# Patient Record
Sex: FEMALE | Race: WHITE | NOT HISPANIC OR LATINO | Employment: FULL TIME | URBAN - METROPOLITAN AREA
[De-identification: names, ages, dates, MRNs, and addresses within clinical notes are randomized per-mention and may not be internally consistent; named-entity substitution may affect disease eponyms.]

---

## 2017-01-31 ENCOUNTER — OFFICE VISIT (OUTPATIENT)
Dept: NEUROSURGERY | Facility: CLINIC | Age: 47
End: 2017-01-31

## 2017-01-31 VITALS — DIASTOLIC BLOOD PRESSURE: 81 MMHG | SYSTOLIC BLOOD PRESSURE: 128 MMHG | HEIGHT: 64 IN | HEART RATE: 87 BPM

## 2017-01-31 DIAGNOSIS — Z98.2 S/P VENTRICULOPERITONEAL SHUNT: Primary | ICD-10-CM

## 2017-01-31 ASSESSMENT — PAIN SCALES - GENERAL: PAINLEVEL: MODERATE PAIN (5)

## 2017-01-31 NOTE — LETTER
1/31/2017       RE: Loyda Abraham  [address] [address]     Dear Colleague,    Thank you for referring your patient, Loyda Abraham, to the [facility] NEUROSURGERY at [facility]. Please see a copy of my visit note below.    January 31, 2017            Maylin Cline PA-C   [address] [place] [address]      RE:  Loyda Abraham      Dear Ms. Cline:      We had the pleasure of seeing Ms. Abraham in Neurosurgery Clinic today for routine followup of  shunt.  As you know, Ms. Abraham had a  shunt placed following a decompression of Chiari malformation, last revised 13 months.  Ms. Abraham has been doing well with the exception of a fall she sustained approximately 3 weeks ago.  She reports she was walking down the stairs at her house on the way down to work, and she slipped and fell and struck the right side of her head onto her retaining wall.  She denied any loss of consciousness or significant laceration injury.        She has been continuing physical therapy for right shoulder tightness, as well as working with a nutritionist for improved eating habits.  Since the last visit, she has lost approximately 30 pounds through the altered diet and increased exercise.  Her goal is to lose 30 more pounds.  Overall, she is doing well with the exception of residual sensation of wearing a helmet since falling and striking her head.  She also reports that she continues to have intermittent difficulty with gait which she has had periodically for the past several years.      On exam, cranial nerves II-XII are intact.  Strength is 5/5 and symmetric in the upper and lower extremities, and reflexes are 2/4 and symmetric in the upper and lower extremities.  There is no pronator drift.  The shunt valves are palpable in the right frontal region, as well as the right parietal region.  There is some mild tenderness over T-tube of the right occipital region, and the shunt tubing  is palpable below the skin down into the neck.      Ms. Abraham has been doing well, and from our standpoint, she does not need to follow up in Neurosurgery Clinic at a scheduled interval at this point.  She is always welcome in the Neurosurgery Clinic and is encouraged to contact clinic to make an appointment should she have any concerns in the future.      Thank you for allowing us to participate in Ms. Abraham's care.  Please do not hesitate to contact the Neurosurgery Clinic if there are any questions or concerns regarding the care of this patient.          DANIEL FORD MD       As dictated by NENA HARTMAN MD, PHD, PGY1 neurosurgery resident.             D: 2017 19:01   T: 2017 07:13   MT: KATHI      Name:     VAMSI ABRAHAM   MRN:      [id number]        Account:      [id number]   :      1970           Service Date: 2017      Document: [id number]      Please see dictated note #[id number].    Nena Hartman MD,PhD  Neurosurgery PGY-1

## 2017-01-31 NOTE — NURSING NOTE
Chief Complaint   Patient presents with     RECHECK      shunt/ hx of chiari decompression    Can Echavarria CMA

## 2017-01-31 NOTE — MR AVS SNAPSHOT
"              After Visit Summary   1/31/2017    Loyda Abraham    MRN: [id number]           Patient Information     Date Of Birth          1970        Visit Information        Provider Department      1/31/2017 4:20 PM Tim Lee MD [facility] Neurosurgery        Today's Diagnoses     S/P ventriculoperitoneal shunt    -  1       Follow-ups after your visit        Who to contact     Please call your clinic at [phone] to:    Ask questions about your health    Make or cancel appointments    Discuss your medicines    Learn about your test results    Speak to your doctor   If you have compliments or concerns about an experience at your clinic, or if you wish to file a complaint, please contact [facility] Patient Relations at [phone] or email us at [email or web address]         Additional Information About Your Visit        MyChart Information     [employer] gives you secure access to your electronic health record. If you see a primary care provider, you can also send messages to your care team and make appointments. If you have questions, please call your primary care clinic.  If you do not have a primary care provider, please call [phone] and they will assist you.      [employer] is an electronic gateway that provides easy, online access to your medical records. With [employer], you can request a clinic appointment, read your test results, renew a prescription or communicate with your care team.     To access your existing account, please contact your [facility] or call [phone] for assistance.        Care EveryWhere ID     This is your Care EveryWhere ID. This could be used by other organizations to access your [facility] medical records  [id number]        Your Vitals Were     Pulse Height                87 1.626 m (5' 4\")           Blood Pressure from Last 3 Encounters:   01/31/17 128/81   10/11/16 (!) 160/91   07/19/16 147/81 "    Weight from Last 3 Encounters:   10/11/16 107.3 kg (236 lb 9.6 oz)   04/26/16 104.3 kg (230 lb)   03/15/16 104.3 kg (230 lb)              Today, you had the following     No orders found for display         Today's Medication Changes          These changes are accurate as of: 1/31/17 11:59 PM.  If you have any questions, ask your nurse or doctor.               These medicines have changed or have updated prescriptions.        Dose/Directions    ondansetron 4 MG ODT tab   Commonly known as:  ZOFRAN-ODT   This may have changed:  when to take this   Used for:  Nausea        Dose:  4 mg   Take 1 tablet (4 mg) by mouth every 6 hours as needed for nausea   Quantity:  30 tablet   Refills:  2                Primary Care Provider Office Phone # Fax #    Maylin Cline PA-C [phone]       [place] [id number]        Thank you!     Thank you for choosing [facility]  for your care. Our goal is always to provide you with excellent care. Hearing back from our patients is one way we can continue to improve our services. Please take a few minutes to complete the written survey that you may receive in the mail after your visit with us. Thank you!             Your Updated Medication List - Protect others around you: Learn how to safely use, store and throw away your medicines at www.disposemymeds.org.          This list is accurate as of: 1/31/17 11:59 PM.  Always use your most recent med list.                   Brand Name Dispense Instructions for use    ACETAMINOPHEN 8 HOUR 650 MG 8 hour tablet   Generic drug:  acetaminophen     100 tablet    Take 650 mg by mouth every 4 hours as needed       calcium carbonate 500 MG chewable tablet    TUMS     Take 1 chew tab by mouth daily       ondansetron 4 MG ODT tab    ZOFRAN-ODT    30 tablet    Take 1 tablet (4 mg) by mouth every 6 hours as needed for nausea       senna-docusate 8.6-50 MG per tablet     SENOKOT-S;PERICOLACE    30 tablet    Take 1-2 tablets by mouth as needed Hold for loose stools.       VITAMIN D (CHOLECALCIFEROL) PO      Take 5,000 Units by mouth twice a week

## 2017-02-01 NOTE — PROGRESS NOTES
January 31, 2017            Maylin Cline PA-C   [address] [place] [address]      RE:  Loyda Jarad      Dear Ms. Cline:      We had the pleasure of seeing Ms. Abraham in Neurosurgery Clinic today for routine followup of  shunt.  As you know, Ms. Abraham had a  shunt placed following a decompression of Chiari malformation, last revised 13 months.  Ms. Abraham has been doing well with the exception of a fall she sustained approximately 3 weeks ago.  She reports she was walking down the stairs at her house on the way down to work, and she slipped and fell and struck the right side of her head onto her retaining wall.  She denied any loss of consciousness or significant laceration injury.        She has been continuing physical therapy for right shoulder tightness, as well as working with a nutritionist for improved eating habits.  Since the last visit, she has lost approximately 30 pounds through the altered diet and increased exercise.  Her goal is to lose 30 more pounds.  Overall, she is doing well with the exception of residual sensation of wearing a helmet since falling and striking her head.  She also reports that she continues to have intermittent difficulty with gait which she has had periodically for the past several years.      On exam, cranial nerves II-XII are intact.  Strength is 5/5 and symmetric in the upper and lower extremities, and reflexes are 2/4 and symmetric in the upper and lower extremities.  There is no pronator drift.  The shunt valves are palpable in the right frontal region, as well as the right parietal region.  There is some mild tenderness over T-tube of the right occipital region, and the shunt tubing is palpable below the skin down into the neck.      Ms. Abraham has been doing well, and from our standpoint, she does not need to follow up in Neurosurgery Clinic at a scheduled interval at this point.  She is always welcome in the Neurosurgery Clinic and is encouraged to  contact clinic to make an appointment should she have any concerns in the future.      Thank you for allowing us to participate in Ms. Abraham's care.  Please do not hesitate to contact the Neurosurgery Clinic if there are any questions or concerns regarding the care of this patient.          DANIEL FORD MD       As dictated by NENA KINSEY MD, PHD, PGY1 neurosurgery resident.             D: 2017 19:01   T: 2017 07:13   MT: KATHI      Name:     VAMSI ABRAHAM   MRN:      [id number]        Account:      [id number]   :      1970           Service Date: 2017      Document: [id number]

## 2017-03-31 ENCOUNTER — RECORDS - HEALTHEAST (OUTPATIENT)
Dept: LAB | Facility: CLINIC | Age: 47
End: 2017-03-31

## 2017-03-31 LAB
CHOLEST SERPL-MCNC: 157 MG/DL
FASTING STATUS PATIENT QL REPORTED: ABNORMAL
HDLC SERPL-MCNC: 40 MG/DL
LDLC SERPL CALC-MCNC: 84 MG/DL
TRIGL SERPL-MCNC: 163 MG/DL

## 2017-04-06 LAB
HPV INTERPRETATION - HISTORICAL: NORMAL
HPV INTERPRETER - HISTORICAL: NORMAL

## 2017-04-10 LAB
BKR LAB AP ABNORMAL BLEEDING: NO
BKR LAB AP BIRTH CONTROL/HORMONES: ABNORMAL
BKR LAB AP CERVICAL APPEARANCE: NORMAL
BKR LAB AP GYN ADEQUACY: ABNORMAL
BKR LAB AP GYN INTERPRETATION: ABNORMAL
BKR LAB AP HPV REFLEX: ABNORMAL
BKR LAB AP LMP: ABNORMAL
BKR LAB AP PATIENT STATUS: ABNORMAL
BKR LAB AP PREVIOUS ABNORMAL: ABNORMAL
BKR LAB AP PREVIOUS NORMAL: 2014
HIGH RISK?: NO
PATH REPORT.COMMENTS IMP SPEC: ABNORMAL
RESULT FLAG (HE HISTORICAL CONVERSION): ABNORMAL

## 2017-08-18 ENCOUNTER — MEDICAL CORRESPONDENCE (OUTPATIENT)
Facility: CLINIC | Age: 47
End: 2017-08-18

## 2017-08-26 ENCOUNTER — HEALTH MAINTENANCE LETTER (OUTPATIENT)
Age: 47
End: 2017-08-26

## 2017-09-27 ENCOUNTER — OFFICE VISIT (OUTPATIENT)
Dept: NEUROSURGERY | Facility: CLINIC | Age: 47
End: 2017-09-27

## 2017-09-27 VITALS — HEART RATE: 88 BPM | SYSTOLIC BLOOD PRESSURE: 125 MMHG | DIASTOLIC BLOOD PRESSURE: 81 MMHG | HEIGHT: 64 IN

## 2017-09-27 DIAGNOSIS — Z98.2 VP (VENTRICULOPERITONEAL) SHUNT STATUS: Primary | ICD-10-CM

## 2017-09-27 ASSESSMENT — PAIN SCALES - GENERAL: PAINLEVEL: SEVERE PAIN (6)

## 2017-09-27 NOTE — MR AVS SNAPSHOT
After Visit Summary   9/27/2017    Loyda Abraham    MRN: [id number]           Patient Information     Date Of Birth          1970        Visit Information        Provider Department      9/27/2017 2:00 PM Tim Lee MD [facility] Neurosurgery        Today's Diagnoses      (ventriculoperitoneal) shunt status    -  1       Follow-ups after your visit        Your next 10 appointments already scheduled     Oct 24, 2017  7:40 AM CDT   (Arrive by 7:25 AM)   CT ABDOMEN W/O & W CONTRAST with UCCT1   [facility] CT ([facility])    [address] [address] [phone]           Please bring any scans or X-rays taken at other hospitals, if similar tests were done. Also bring a list of your medicines, including vitamins, minerals and over-the-counter drugs. It is safest to leave personal items at home.  Be sure to tell your doctor:   If you have any allergies.   If there s any chance you are pregnant.   If you are breastfeeding.   If you have any special needs.  You may have contrast for this exam. To prepare:   Do not eat or drink for 2 hours before your exam. If you need to take medicine, you may take it with small sips of water. (We may ask you to take liquid medicine as well.)   The day before your exam, drink extra fluids at least six 8-ounce glasses (unless your doctor tells you to restrict your fluids).  Patients over 70 or patients with diabetes or kidney problems:   If you haven t had a blood test (creatinine test) within the last 30 days, go to your clinic or Diagnostic Imaging Department for this test.  If you have diabetes:   If your kidney function is normal, continue taking your metformin (Avandamet, Glucophage, Glucovance, Metaglip) on the day of your exam.   If your kidney function is abnormal, wait 48 hours before restarting this medicine.  You will have oral contrast for this exam:   You will drink the  contrast at home. Get this from your clinic or Diagnostic Imaging Department. Please follow the directions given.  Please wear loose clothing, such as a sweat suit or jogging clothes. Avoid snaps, zippers and other metal. We may ask you to undress and put on a hospital gown.  If you have any questions, please call the Imaging Department where you will have your exam.            Oct 24, 2017  8:40 AM CDT   (Arrive by 8:25 AM)   Return Visit with Tim Lee MD   [facility] Neurosurgery ([facility] [place])    [address] [address] [phone]              Who to contact     Please call your clinic at [phone] to:    Ask questions about your health    Make or cancel appointments    Discuss your medicines    Learn about your test results    Speak to your doctor   If you have compliments or concerns about an experience at your clinic, or if you wish to file a complaint, please contact [facility] Patient Relations at [phone] or email us at [email or web address]         Additional Information About Your Visit        MyChart Information     [employer] gives you secure access to your electronic health record. If you see a primary care provider, you can also send messages to your care team and make appointments. If you have questions, please call your primary care clinic.  If you do not have a primary care provider, please call [phone] and they will assist you.      [employer] is an electronic gateway that provides easy, online access to your medical records. With [employer], you can request a clinic appointment, read your test results, renew a prescription or communicate with your care team.     To access your existing account, please contact your [facility] Clinic or call [phone] for assistance.        Care EveryWhere ID     This is your Care EveryWhere ID. This could be used by other  "organizations to access your [facility] medical records  [id number]        Your Vitals Were     Pulse Height                88 1.626 m (5' 4\")           Blood Pressure from Last 3 Encounters:   09/27/17 125/81   01/31/17 128/81   10/11/16 (!) 160/91    Weight from Last 3 Encounters:   10/11/16 107.3 kg (236 lb 9.6 oz)   04/26/16 104.3 kg (230 lb)   03/15/16 104.3 kg (230 lb)                 Today's Medication Changes          These changes are accurate as of: 9/27/17 11:59 PM.  If you have any questions, ask your nurse or doctor.               These medicines have changed or have updated prescriptions.        Dose/Directions    ondansetron 4 MG ODT tab   Commonly known as:  ZOFRAN-ODT   This may have changed:  when to take this   Used for:  Nausea        Dose:  4 mg   Take 1 tablet (4 mg) by mouth every 6 hours as needed for nausea   Quantity:  30 tablet   Refills:  2                Primary Care Provider Office Phone # Fax #    Maylin Cline PA-C [phone]       [place] [address]        Equal Access to Services     SERA HEIN AH: Hadii flaca garciao Sorigo, waaxda luqadaha, qaybta kaalmada adeegyada, goyo saeed. So [facility] [phone].    ATENCIÓN: Si habla español, tiene a nguyen disposición servicios gratuitos de asistencia lingüística. [facility] [phone].    We comply with applicable federal civil rights laws and [place] laws. We do not discriminate on the basis of race, color, national origin, age, disability, sex, sexual orientation, or gender identity.            Thank you!     Thank you for choosing [facility]  for your care. Our goal is always to provide you with excellent care. Hearing back from our patients is one way we can continue to improve our services. Please take a few minutes to complete the written survey that you may receive in the mail after your visit with us. Thank you!             Your " Updated Medication List - Protect others around you: Learn how to safely use, store and throw away your medicines at www.disposemymeds.org.          This list is accurate as of: 9/27/17 11:59 PM.  Always use your most recent med list.                   Brand Name Dispense Instructions for use Diagnosis    ACETAMINOPHEN 8 HOUR 650 MG 8 hour tablet   Generic drug:  acetaminophen     100 tablet    Take 650 mg by mouth every 4 hours as needed     (ventriculoperitoneal) shunt status       calcium carbonate 500 MG chewable tablet    TUMS     Take 1 chew tab by mouth daily        ondansetron 4 MG ODT tab    ZOFRAN-ODT    30 tablet    Take 1 tablet (4 mg) by mouth every 6 hours as needed for nausea    Nausea       senna-docusate 8.6-50 MG per tablet    SENOKOT-S;PERICOLACE    30 tablet    Take 1-2 tablets by mouth as needed Hold for loose stools.    Shunt malfunction, initial encounter       VITAMIN D (CHOLECALCIFEROL) PO      Take 5,000 Units by mouth twice a week

## 2017-09-27 NOTE — LETTER
2017       RE: Vamsi Abraham  [address] [address]     Dear Colleague,    Thank you for referring your patient, Vamsi Abraham, to the [facility] NEUROSURGERY at [facility]. Please see a copy of my visit note below.    2017            Maylin Cline PA-C   [address] [place] [address]      RE:  Vamsi Abraham      Dear Dr. Cline:      I had the pleasure to see Vamsi Abraham today in my Neurosurgical Clinic for evaluation of her ventriculoperitoneal shunt.  Claudia has been having abdominal pain over the last few months.  She says that this pain is intermittent, and it really migrates around her abdomen but is quite uncomfortable.      She does not have any signs of infection.      I have had this experience with a few patients who have a ventriculoperitoneal shunt in the past.  In 3 of those patients, we have gone back to the operating room and laparoscopically shortened the tubing that is in the abdomen.  This certainly may be an option for Claudia.  I would like to first start by getting a CT of the abdomen with and without contrast and ensure that there is not a problem.  If this is normal, then I would talk to Karlos Branch and plan a laparoscopic procedure in which we go in and shorten the tubing.  We will order the CT and then have her come back and see me in clinic.      Thank you for your trust and opportunity to participate in Claudia's care.  If you have further questions or concerns, please feel free to contact me on my cell phone at [phone].         DANIEL FORD MD             D: 2017 14:44   T: 2017 09:19   MT: KATHI      Name:     VAMSI ABRAHAM   MRN:      [id number]        Account:      [id number]   :      1970           Service Date: 2017      Document: [id number]       Again, thank you for allowing me to participate in the care of your patient.      Sincerely,    Daniel Ford,  MD

## 2017-09-27 NOTE — NURSING NOTE
Chief Complaint   Patient presents with     RECHECK      shunt/ Hx of chiari decompression/ Had CT done on 08/21/2017. -COLE Echavarria CMA

## 2017-09-28 NOTE — PROGRESS NOTES
2017            Maylin Cline PA-C   [address] [place] [address]      RE:  Vamsi Abraham      Dear Dr. Cline:      I had the pleasure to see Vamsi Abraham today in my Neurosurgical Clinic for evaluation of her ventriculoperitoneal shunt.  Claudia has been having abdominal pain over the last few months.  She says that this pain is intermittent, and it really migrates around her abdomen but is quite uncomfortable.      She does not have any signs of infection.      I have had this experience with a few patients who have a ventriculoperitoneal shunt in the past.  In 3 of those patients, we have gone back to the operating room and laparoscopically shortened the tubing that is in the abdomen.  This certainly may be an option for Claudia.  I would like to first start by getting a CT of the abdomen with and without contrast and ensure that there is not a problem.  If this is normal, then I would talk to Karlos Branch and plan a laparoscopic procedure in which we go in and shorten the tubing.  We will order the CT and then have her come back and see me in clinic.      Thank you for your trust and opportunity to participate in Claudia's care.  If you have further questions or concerns, please feel free to contact me on my cell phone at [phone].         DANIEL FORD MD             D: 2017 14:44   T: 2017 09:19   MT: KATHI      Name:     VAMSI ABRAHAM   MRN:      [id number]        Account:      [id number]   :      1970           Service Date: 2017      Document: [id number]

## 2017-10-24 ENCOUNTER — OFFICE VISIT (OUTPATIENT)
Dept: NEUROSURGERY | Facility: CLINIC | Age: 47
End: 2017-10-24

## 2017-10-24 VITALS
BODY MASS INDEX: 32.44 KG/M2 | DIASTOLIC BLOOD PRESSURE: 80 MMHG | HEART RATE: 77 BPM | SYSTOLIC BLOOD PRESSURE: 139 MMHG | WEIGHT: 190 LBS | HEIGHT: 64 IN

## 2017-10-24 DIAGNOSIS — Z98.2 S/P VENTRICULOPERITONEAL SHUNT: Primary | ICD-10-CM

## 2017-10-24 ASSESSMENT — PAIN SCALES - GENERAL: PAINLEVEL: NO PAIN (0)

## 2017-10-24 NOTE — MR AVS SNAPSHOT
After Visit Summary   10/24/2017    Loyda Abraham    MRN: [id number]           Patient Information     Date Of Birth          1970        Visit Information        Provider Department      10/24/2017 8:40 AM Tim Lee MD [facility] Neurosurgery        Today's Diagnoses     S/P ventriculoperitoneal shunt    -  1       Follow-ups after your visit        Follow-up notes from your care team     Return if symptoms worsen or fail to improve.      Who to contact     Please call your clinic at [phone] to:    Ask questions about your health    Make or cancel appointments    Discuss your medicines    Learn about your test results    Speak to your doctor   If you have compliments or concerns about an experience at your clinic, or if you wish to file a complaint, please contact [facility] Patient Relations at [phone] or email us at [email or web address]         Additional Information About Your Visit        MyChart Information     [employer] gives you secure access to your electronic health record. If you see a primary care provider, you can also send messages to your care team and make appointments. If you have questions, please call your primary care clinic.  If you do not have a primary care provider, please call [phone] and they will assist you.      [employer] is an electronic gateway that provides easy, online access to your medical records. With [employer], you can request a clinic appointment, read your test results, renew a prescription or communicate with your care team.     To access your existing account, please contact your [facility] or call [phone] for assistance.        Care EveryWhere ID     This is your Care EveryWhere ID. This could be used by other organizations to access your [place] medical records  [id number]        Your Vitals Were     Pulse Height BMI (Body Mass Index)             77 1.626 m  "(5' 4\") 32.61 kg/m2          Blood Pressure from Last 3 Encounters:   10/24/17 139/80   09/27/17 125/81   01/31/17 128/81    Weight from Last 3 Encounters:   10/24/17 86.2 kg (190 lb)   10/11/16 107.3 kg (236 lb 9.6 oz)   04/26/16 104.3 kg (230 lb)              Today, you had the following     No orders found for display         Today's Medication Changes          These changes are accurate as of: 10/24/17 11:59 PM.  If you have any questions, ask your nurse or doctor.               These medicines have changed or have updated prescriptions.        Dose/Directions    ondansetron 4 MG ODT tab   Commonly known as:  ZOFRAN-ODT   This may have changed:  when to take this   Used for:  Nausea        Dose:  4 mg   Take 1 tablet (4 mg) by mouth every 6 hours as needed for nausea   Quantity:  30 tablet   Refills:  2                Primary Care Provider Office Phone # Fax #    Maylin Cline PA-C [phone]       [place] [address]        Equal Access to Services     [facility] AH: Hadii flaca garner hadcharleso Sorigo, waaxda luqadaha, qaybta kaalmada felix, goyo mack . [facility] [phone].    ATENCIÓN: Si habla español, tiene a nguyen disposición servicios gratuitos de asistencia lingüística. [facility] [phone].    We comply with applicable federal civil rights laws and [place] laws. We do not discriminate on the basis of race, color, national origin, age, disability, sex, sexual orientation, or gender identity.            Thank you!     Thank you for choosing [facility]  for your care. Our goal is always to provide you with excellent care. Hearing back from our patients is one way we can continue to improve our services. Please take a few minutes to complete the written survey that you may receive in the mail after your visit with us. Thank you!             Your Updated Medication List - Protect others around you: Learn how " to safely use, store and throw away your medicines at www.disposemymeds.org.          This list is accurate as of: 10/24/17 11:59 PM.  Always use your most recent med list.                   Brand Name Dispense Instructions for use Diagnosis    ACETAMINOPHEN 8 HOUR 650 MG 8 hour tablet   Generic drug:  acetaminophen     100 tablet    Take 650 mg by mouth every 4 hours as needed     (ventriculoperitoneal) shunt status       calcium carbonate 500 MG chewable tablet    TUMS     Take 1 chew tab by mouth daily        ondansetron 4 MG ODT tab    ZOFRAN-ODT    30 tablet    Take 1 tablet (4 mg) by mouth every 6 hours as needed for nausea    Nausea       senna-docusate 8.6-50 MG per tablet    SENOKOT-S;PERICOLACE    30 tablet    Take 1-2 tablets by mouth as needed Hold for loose stools.    Shunt malfunction, initial encounter       VITAMIN D (CHOLECALCIFEROL) PO      Take 5,000 Units by mouth twice a week

## 2017-10-24 NOTE — LETTER
10/24/2017       RE: Loyda Abraham  [address] [address]     Dear Colleague,    Thank you for referring your patient, Loyda Abraham, to the [facility] NEUROSURGERY at [facility]. Please see a copy of my visit note below.    October 24, 2017            Maylin Cline PA-C   [address] [place] [address]      RE:  Loyda Abraham      Dear Ms. Cline:      I had the pleasure to see Loyda today in my Neurosurgical Clinic for evaluation of her ventriculoperitoneal shunt.      Briefly, Loyda is a 47-year-old woman that I have known for several years.  She has a ventriculoperitoneal shunt and has been doing well with the exception of intermittent abdominal pain.  She says that this pain is sharp at times, moves around and is intermittent.  She does not feel that it is significantly impacting her quality of life, however.      I had seen her a few weeks ago and wanted to get a CT of the abdomen to ensure that there is no pseudomeningocele or any concerning abnormalities.  She had a CT today, and I reviewed it with her in clinic.  I do not see any pseudomeningocele, pseudocyst or any other concerning finding.  I have seen in the past patients where the shunt tubing can be an irritant and can cause intermittent symptoms like she is describing.  We have taken 3 patients back to the operating room and laparoscopically shortened the tubing. In 2 of these patients, their pain improved, and in 1, it did not.  I have talked to her about this today; and ultimately, we decided that we would not pursue any surgical intervention at the moment and follow this p.r.n.      Overall, I spent approximately 20 minutes with Loyda, with the majority of this time spent in consultation and developing a treatment plan.      Thank you for your trust and the opportunity to participate in Loyda's care.  If you have any further questions or concerns, please feel free to  contact me on my cell phone at [phone].         DANIEL FORD MD             D: 10/24/2017 08:34   T: 10/24/2017 08:50   MT: KATHI      Name:     VAMSI CABA   MRN:      [id number]        Account:      [id number]   :      1970           Service Date: 10/24/2017      Document: [id number]       Again, thank you for allowing me to participate in the care of your patient.      Sincerely,    Daniel Ford MD

## 2017-10-24 NOTE — PROGRESS NOTES
2017            Maylin Cline PA-C   [address] [place] [address]      RE:  Vamsi Abraham      Dear Ms. Son:      I had the pleasure to see Vamsi today in my Neurosurgical Clinic for evaluation of her ventriculoperitoneal shunt.      Briefly, Vamsi is a 47-year-old woman that I have known for several years.  She has a ventriculoperitoneal shunt and has been doing well with the exception of intermittent abdominal pain.  She says that this pain is sharp at times, moves around and is intermittent.  She does not feel that it is significantly impacting her quality of life, however.      I had seen her a few weeks ago and wanted to get a CT of the abdomen to ensure that there is no pseudomeningocele or any concerning abnormalities.  She had a CT today, and I reviewed it with her in clinic.  I do not see any pseudomeningocele, pseudocyst or any other concerning finding.  I have seen in the past patients where the shunt tubing can be an irritant and can cause intermittent symptoms like she is describing.  We have taken 3 patients back to the operating room and laparoscopically shortened the tubing. In 2 of these patients, their pain improved, and in 1, it did not.  I have talked to her about this today; and ultimately, we decided that we would not pursue any surgical intervention at the moment and follow this p.r.n.      Overall, I spent approximately 20 minutes with Vamsi, with the majority of this time spent in consultation and developing a treatment plan.      Thank you for your trust and the opportunity to participate in Vamsi's care.  If you have any further questions or concerns, please feel free to contact me on my cell phone at [phone].         DANIEL FORD MD             D: 10/24/2017 08:34   T: 10/24/2017 08:50   MT: KATHI      Name:     VAMSI ABRAHAM   MRN:      [id number]        Account:      [id number]   :      1970           Service Date:  10/24/2017      Document: [id number]

## 2018-04-10 ENCOUNTER — APPOINTMENT (OUTPATIENT)
Dept: CT IMAGING | Facility: CLINIC | Age: 48
End: 2018-04-10
Attending: EMERGENCY MEDICINE
Payer: COMMERCIAL

## 2018-04-10 ENCOUNTER — TELEPHONE (OUTPATIENT)
Dept: NEUROSURGERY | Facility: CLINIC | Age: 48
End: 2018-04-10

## 2018-04-10 ENCOUNTER — APPOINTMENT (OUTPATIENT)
Dept: MRI IMAGING | Facility: CLINIC | Age: 48
End: 2018-04-10
Attending: EMERGENCY MEDICINE
Payer: COMMERCIAL

## 2018-04-10 ENCOUNTER — HOSPITAL ENCOUNTER (EMERGENCY)
Facility: CLINIC | Age: 48
Discharge: HOME | End: 2018-04-10
Attending: EMERGENCY MEDICINE | Admitting: EMERGENCY MEDICINE
Payer: COMMERCIAL

## 2018-04-10 VITALS
TEMPERATURE: 98 F | WEIGHT: 196.5 LBS | OXYGEN SATURATION: 95 % | HEIGHT: 64 IN | SYSTOLIC BLOOD PRESSURE: 113 MMHG | HEART RATE: 90 BPM | DIASTOLIC BLOOD PRESSURE: 69 MMHG | RESPIRATION RATE: 16 BRPM | BODY MASS INDEX: 33.55 KG/M2

## 2018-04-10 DIAGNOSIS — Z98.2 VP (VENTRICULOPERITONEAL) SHUNT STATUS: ICD-10-CM

## 2018-04-10 DIAGNOSIS — R20.0 RIGHT FACIAL NUMBNESS: ICD-10-CM

## 2018-04-10 LAB
ANION GAP SERPL CALCULATED.3IONS-SCNC: 6 MMOL/L (ref 3–14)
APTT PPP: 28 SEC (ref 22–37)
BASOPHILS # BLD AUTO: 0 10E9/L (ref 0–0.2)
BASOPHILS NFR BLD AUTO: 0.2 %
BUN SERPL-MCNC: 21 MG/DL (ref 7–30)
CALCIUM SERPL-MCNC: 9.9 MG/DL (ref 8.5–10.1)
CHLORIDE SERPL-SCNC: 105 MMOL/L (ref 94–109)
CO2 SERPL-SCNC: 26 MMOL/L (ref 20–32)
CREAT BLD-MCNC: 0.9 MG/DL (ref 0.52–1.04)
CREAT SERPL-MCNC: 0.84 MG/DL (ref 0.52–1.04)
DIFFERENTIAL METHOD BLD: NORMAL
EOSINOPHIL # BLD AUTO: 0.2 10E9/L (ref 0–0.7)
EOSINOPHIL NFR BLD AUTO: 2.1 %
ERYTHROCYTE [DISTWIDTH] IN BLOOD BY AUTOMATED COUNT: 12.7 % (ref 10–15)
GFR SERPL CREATININE-BSD FRML MDRD: 67 ML/MIN/1.7M2
GFR SERPL CREATININE-BSD FRML MDRD: 73 ML/MIN/1.7M2
GLUCOSE BLDC GLUCOMTR-MCNC: 107 MG/DL (ref 70–99)
GLUCOSE SERPL-MCNC: 87 MG/DL (ref 70–99)
HCT VFR BLD AUTO: 45.7 % (ref 35–47)
HGB BLD-MCNC: 15.3 G/DL (ref 11.7–15.7)
IMM GRANULOCYTES # BLD: 0.1 10E9/L (ref 0–0.4)
IMM GRANULOCYTES NFR BLD: 0.6 %
INR BLD: 1 (ref 0.86–1.14)
INR PPP: 0.98 (ref 0.86–1.14)
LYMPHOCYTES # BLD AUTO: 2.9 10E9/L (ref 0.8–5.3)
LYMPHOCYTES NFR BLD AUTO: 33.4 %
MCH RBC QN AUTO: 30.1 PG (ref 26.5–33)
MCHC RBC AUTO-ENTMCNC: 33.5 G/DL (ref 31.5–36.5)
MCV RBC AUTO: 90 FL (ref 78–100)
MONOCYTES # BLD AUTO: 0.7 10E9/L (ref 0–1.3)
MONOCYTES NFR BLD AUTO: 7.8 %
NEUTROPHILS # BLD AUTO: 4.8 10E9/L (ref 1.6–8.3)
NEUTROPHILS NFR BLD AUTO: 55.9 %
NRBC # BLD AUTO: 0 10*3/UL
NRBC BLD AUTO-RTO: 0 /100
PLATELET # BLD AUTO: 259 10E9/L (ref 150–450)
POTASSIUM SERPL-SCNC: 4.1 MMOL/L (ref 3.4–5.3)
RBC # BLD AUTO: 5.09 10E12/L (ref 3.8–5.2)
SODIUM SERPL-SCNC: 137 MMOL/L (ref 133–144)
WBC # BLD AUTO: 8.6 10E9/L (ref 4–11)

## 2018-04-10 PROCEDURE — 70551 MRI BRAIN STEM W/O DYE: CPT

## 2018-04-10 PROCEDURE — 80048 BASIC METABOLIC PNL TOTAL CA: CPT | Performed by: EMERGENCY MEDICINE

## 2018-04-10 PROCEDURE — 85025 COMPLETE CBC W/AUTO DIFF WBC: CPT | Performed by: EMERGENCY MEDICINE

## 2018-04-10 PROCEDURE — 82565 ASSAY OF CREATININE: CPT | Mod: 91

## 2018-04-10 PROCEDURE — 00000146 ZZHCL STATISTIC GLUCOSE BY METER IP

## 2018-04-10 PROCEDURE — 96360 HYDRATION IV INFUSION INIT: CPT | Mod: 59 | Performed by: INTERNAL MEDICINE

## 2018-04-10 PROCEDURE — 99285 EMERGENCY DEPT VISIT HI MDM: CPT | Mod: 25 | Performed by: INTERNAL MEDICINE

## 2018-04-10 PROCEDURE — 25000128 H RX IP 250 OP 636: Performed by: RADIOLOGY

## 2018-04-10 PROCEDURE — 99285 EMERGENCY DEPT VISIT HI MDM: CPT | Mod: Z6 | Performed by: EMERGENCY MEDICINE

## 2018-04-10 PROCEDURE — 85610 PROTHROMBIN TIME: CPT | Performed by: EMERGENCY MEDICINE

## 2018-04-10 PROCEDURE — 25000128 H RX IP 250 OP 636: Performed by: EMERGENCY MEDICINE

## 2018-04-10 PROCEDURE — 93005 ELECTROCARDIOGRAM TRACING: CPT | Performed by: INTERNAL MEDICINE

## 2018-04-10 PROCEDURE — 70498 CT ANGIOGRAPHY NECK: CPT

## 2018-04-10 PROCEDURE — 85610 PROTHROMBIN TIME: CPT | Mod: QW

## 2018-04-10 PROCEDURE — 85730 THROMBOPLASTIN TIME PARTIAL: CPT | Performed by: EMERGENCY MEDICINE

## 2018-04-10 RX ORDER — IOPAMIDOL 755 MG/ML
75 INJECTION, SOLUTION INTRAVASCULAR ONCE
Status: COMPLETED | OUTPATIENT
Start: 2018-04-10 | End: 2018-04-10

## 2018-04-10 RX ADMIN — SODIUM CHLORIDE 500 ML: 9 INJECTION, SOLUTION INTRAVENOUS at 12:19

## 2018-04-10 RX ADMIN — IOPAMIDOL 75 ML: 755 INJECTION, SOLUTION INTRAVENOUS at 11:35

## 2018-04-10 ASSESSMENT — ENCOUNTER SYMPTOMS
WEAKNESS: 0
TREMORS: 1
NUMBNESS: 1
HEADACHES: 1

## 2018-04-10 ASSESSMENT — VISUAL ACUITY
OU: NORMAL ACUITY

## 2018-04-10 NOTE — CONSULTS
[facility]  NEUROSURGERY CONSULTATION NOTE    This consultation was requested by Dr. Sosa from the Emergency medicine service.    Reason for Consultation  Concern for  shunt failure    HPI:  47 year old female with history of Chiari decompression at [place] in 2012.  She subsequently developed a pseudomeningocele and a shunt was placed.  She ended up developing shunt infection and other shunt related complications necessitating corrections.  In 11/2015, she required removal of her initial shunt system.  At that time, an occipital shunt was placed.  In 12/2015, it was noted her lateral ventricle was not fully draining so a frontal shunt was then also placed.  Per recent notes, her right frontal shunt is a Strata with the valve set at 1.0.  Her occipital shunt is also a Strata valve set to 2.0.   Patient presents to ED on 4/10/18 after waking this morning after biting her tongue and having an episode of tremor/shaking.  She returned to sleep and again had similar symptoms.  She also described having diminished sensation on the right side of her face.  Has had increased intensity of headaches over the past week and feels her vision is  fuzzy.   Has been nauseated but denies emesis.  Has also felt unsteady on her feet and does endorse falling earlier today.  Denies weakness in upper or lower extremities.  Patient has not history of seizure activity.  Denies use of alcohol.  Does not take daily medications.    Upon presentation to the ED a stroke code was activated, no immediate action taken, MRI brain pending at time of documentation.         PAST MEDICAL HISTORY:   Past Medical History:   Diagnosis Date     Gastro-oesophageal reflux disease      Hydrocephalus      PONV (postoperative nausea and vomiting)        PAST SURGICAL HISTORY:   Past Surgical History:   Procedure Laterality Date     CHOLECYSTECTOMY       DECOMPRESSION CHIARI  11/22/2013    Procedure:  DECOMPRESSION CHIARI;;  Surgeon: Tim Lee MD;  Location: [facility] OR     OPTICAL TRACKING SYSTEM IMPLANT SHUNT VENTRICULOPERITONEAL Left 1/6/2015    Procedure: OPTICAL TRACKING SYSTEM IMPLANT SHUNT VENTRICULOPERITONEAL;  Surgeon: Tim Lee MD;  Location: [facility] OR     OPTICAL TRACKING SYSTEM IMPLANT SHUNT VENTRICULOPERITONEAL Right 3/6/2015    Procedure: OPTICAL TRACKING SYSTEM IMPLANT SHUNT VENTRICULOPERITONEAL;  Surgeon: Tim Lee MD;  Location: [facility] OR     OPTICAL TRACKING SYSTEM IMPLANT SHUNT VENTRICULOPERITONEAL Right 12/9/2015    Procedure: OPTICAL TRACKING SYSTEM IMPLANT SHUNT VENTRICULOPERITONEAL;  Surgeon: Tim Lee MD;  Location: [facility] OR     OPTICAL TRACKING SYSTEM IMPLANT SHUNT VENTRICULOPERITONEAL Right 12/15/2015    Procedure: OPTICAL TRACKING SYSTEM IMPLANT SHUNT VENTRICULOPERITONEAL;  Surgeon: Tim Lee MD;  Location: [facility] OR     ORTHOPEDIC SURGERY       REVISE SHUNT VENTRICULARPERITONEAL  11/22/2013    Procedure: REVISE SHUNT VENTRICULARPERITONEAL;  Left Ventricularperitoneal Shunt Exploration with Revision,   Suboccipital Pseudomeningocele Repair;  Surgeon: Tim Lee MD;  Location: [facility] OR     REVISE SHUNT VENTRICULARPERITONEAL Left 2/27/2015    Procedure: REVISE SHUNT VENTRICULARPERITONEAL;  Surgeon: Tim Lee MD;  Location: [facility] OR     REVISE SHUNT VENTRICULARPERITONEAL Right 10/26/2015    Procedure: REVISE SHUNT VENTRICULARPERITONEAL;  Surgeon: Tim Lee MD;  Location: [facility] OR       FAMILY HISTORY: No family history on file.    SOCIAL HISTORY:   Social History   Substance Use Topics     Smoking status: Never Smoker     Smokeless tobacco: Never Used     Alcohol use No       MEDICATIONS:  Current Outpatient Prescriptions   Medication Sig Dispense Refill     VITAMIN D, CHOLECALCIFEROL, PO Take 5,000 Units by mouth twice a week        senna-docusate (SENOKOT-S;PERICOLACE) 8.6-50 MG per tablet Take 1-2 tablets by mouth  "as needed Hold for loose stools. 30 tablet 0     calcium carbonate (TUMS) 500 MG chewable tablet Take 1 chew tab by mouth daily       ondansetron (ZOFRAN-ODT) 4 MG disintegrating tablet Take 1 tablet (4 mg) by mouth every 6 hours as needed for nausea (Patient taking differently: Take 4 mg by mouth as needed for nausea ) 30 tablet 2     acetaminophen 650 MG TABS Take 650 mg by mouth every 4 hours as needed 100 tablet        Allergies:  Allergies   Allergen Reactions     Morphine Hcl Rash         ROS: 10 point ROS of systems including Constitutional, Eyes, Respiratory, Cardiovascular, Gastroenterology, Genitourinary, Integumentary, Muscularskeletal, Psychiatric were all negative except for pertinent positives noted in my HPI.      Exam:   Physical Exam  /89  Pulse 99  Temp 98  F (36.7  C) (Oral)  Resp 25  Ht 1.626 m (5' 4\")  Wt 89.1 kg (196 lb 8 oz)  SpO2 97%  BMI 33.73 kg/m2  General: Appears comfortable, NAD  Neurologic Exam:  - AOx3.  - Follows commands.  - Speech fluent, spontaneous. No aphasia or dysarthria.  - No gaze preference. No apparent hemineglect.  - PERRL, EOMI.  - Face symmetric with sensation intact to light touch with exception of mild diminished sensation to the right cheek  - Palate elevates symmetrically, uvula midline, tongue protrudes midline.  - Trapezii and sternocleidomastoid muscles 5/5 bilaterally.  - No pronator drift.  Motor: Normal bulk/tone; no tremor, rigidity, or bradykinesia.   Right:  Deltoid 5/5, tricep 5/5, bicep 5/5, wrist flexor 5/5, wrist extensor 5/5, finger intrinsic 5/5  Left:  Deltoid 5/5, tricep 5/5, bicep 5/5, wrist flexor 5/5, wrist extensor 5/5, finger intrinsic 5/5  Right: Iliopsoas 5/5, quadricep 5/5, hamstring 5/5, tibialis anterior 5/5, gastrocnemius 5/5, EHL 5/5  Left:  Iliopsoas 5/5, quadricep 5/5, hamstring 5/5, tibialis anterior 5/5, gastrocnemius 5/5, EHL 5/5    Sensation intact in bilateral L4-S1 dermatones     No Valentin's   No pronator drift "           CT Head Neck Angio w/o & w Contrast   Preliminary Result   Impression: Negative head CT/head CTa            MR Brain for Stroke Limited    (Results Pending)       CTA head (4/10):  Head CTA demonstrates no definite aneurysm or stenosis of the major  intracranial arteries. The anterior communicating artery is patent.  The posterior communicating arteries are patent bilaterally.    Head CT  (4/10):  Stable positioning of right frontal and right parietal  approach ventriculostomy catheters with tips terminating in the region  of the foramen of Monro. No ventriculomegaly. No loss of gray-white  matter differentiation to suggest infarct. Stable postsurgical changes  of suboccipital craniectomy and C1 laminectomy for Chiari I  Decompression.    MRI Brain:  Pending     ASSESSMENT:  47 year old female with history of Chiari decompression and right frontal and left occipital shunt placement presenting with complaints of headache, gait unsteadiness and episodes of tremor    RECOMMENDATIONS:  - No neurosurgical intervention indicated at this time   -Does not appear the patient's shunt is the cause of patient's current symptoms, would recommend follow up with Dr Tim Lee in Neurosurgery clinic in 2-4 weeks without imaging    The patient was discussed with Dr. Favio Griggs, neurosurgery chief resident, and he agrees with the above.    Sonali Macias, CNP  Department of Neurosurgery  Pager: [phone]

## 2018-04-10 NOTE — PROVIDER NOTIFICATION
Stroke Code Nurse-Responder Note    Arrival Time to Stroke Code: 11:24    Stroke Code Team interventions:   - De-escalated at 1132 by Neurologist.    ED/Bedside Nurse providing handoff: Monik Lopes RN, Rocky Logan received hand-off from ED RNDallin.    Time left for CT: 11:26    Time arrived to next location (ED/Unit/IR): Pt. Transported back to ED from CT scan at 11:33.    ED/Bedside Nurse given handoff (name/time): Monik Lopes RN, Resource Float provided hand-off to ED RNDallin.    Barbara Lopes, RN

## 2018-04-10 NOTE — ED NOTES
"Pr presents to ED with concern that she had two seizures at home. Pt states she woke herself up at 0200 biting her tongue. Pt states she got up and felt really weak and tried to fall back asleep. pt states she found her self \"vibrating\"for approx 30 minutes around 0400. Pt has hx of  shunt X2. Pt complaining of right sided facial numbness and right neck pain.  Dr. Young brought to triage room to assess patient for possible stroke. Stroke code called at 1122.   "

## 2018-04-10 NOTE — PROGRESS NOTES
Neurosurgery Procedure Note    Shunt valve reset after MRI    Right frontal Strata: 1.0    Right occipital Strata: 2.0    -----------------------------------  Hai Dee MD, MS  Neurosurgery PGY-1

## 2018-04-10 NOTE — ED PROVIDER NOTES
"  History     Chief Complaint   Patient presents with     Seizures     HPI  Loyda Abraham is a 47 year old female with a history of hydrocephalus s/p  shunt (x2) who presents for evaluation of right-sided facial numbness and possible seizures.  Patient reports she woke up at 2 AM this morning because she had whole body shaking, and noticed that she had bitten her tongue.  Her  had been sleeping on the couch, so she went and got him, and he also noticed that the patient was shaking.  She describes the shaking as though \"my whole body was vibrating\" and did bite her tongue a second time around 4:30 AM.  When she woke up at 2 AM and she stayed awake for about 30 minutes and then around 2:30 AM, noticed that she had the onset of right-sided facial numbness that has been constant since.  She was up around 6:30 AM when she noticed she had some gait instability and was tripping over her right foot. No recent falls, but she does note that on Friday she had an episode where she bent over and had the sudden sensation as though someone had hit her in the occipital region of her head. She complains she has had headaches since that episode described as pressure that is gradually worsening and is concerned her symptoms could be due to a shunt malfunction. She has had similar symptoms in the past when her left ventricle in her brain collapsed.     I have reviewed the Medications, Allergies, Past Medical and Surgical History, and Social History in the [facility] system.  Past Medical History:   Diagnosis Date     Gastro-oesophageal reflux disease      Hydrocephalus      PONV (postoperative nausea and vomiting)        Past Surgical History:   Procedure Laterality Date     CHOLECYSTECTOMY       DECOMPRESSION CHIARI  11/22/2013    Procedure: DECOMPRESSION CHIARI;;  Surgeon: Tim Lee MD;  [facility]     OPTICAL TRACKING SYSTEM IMPLANT SHUNT VENTRICULOPERITONEAL Left 1/6/2015    Procedure: OPTICAL TRACKING SYSTEM " IMPLANT SHUNT VENTRICULOPERITONEAL;  Surgeon: Tim Lee MD;  [facility]     OPTICAL TRACKING SYSTEM IMPLANT SHUNT VENTRICULOPERITONEAL Right 3/6/2015    Procedure: OPTICAL TRACKING SYSTEM IMPLANT SHUNT VENTRICULOPERITONEAL;  Surgeon: Tim Lee MD;  [facility]     OPTICAL TRACKING SYSTEM IMPLANT SHUNT VENTRICULOPERITONEAL Right 12/9/2015    Procedure: OPTICAL TRACKING SYSTEM IMPLANT SHUNT VENTRICULOPERITONEAL;  Surgeon: Tim Lee MD;  [facility]     OPTICAL TRACKING SYSTEM IMPLANT SHUNT VENTRICULOPERITONEAL Right 12/15/2015    Procedure: OPTICAL TRACKING SYSTEM IMPLANT SHUNT VENTRICULOPERITONEAL;  Surgeon: Tim Lee MD;  [facility]     ORTHOPEDIC SURGERY       REVISE SHUNT VENTRICULARPERITONEAL  11/22/2013    Procedure: REVISE SHUNT VENTRICULARPERITONEAL;  Left Ventricularperitoneal Shunt Exploration with Revision,   Suboccipital Pseudomeningocele Repair;  Surgeon: Tim Lee MD;  [facility]     REVISE SHUNT VENTRICULARPERITONEAL Left 2/27/2015    Procedure: REVISE SHUNT VENTRICULARPERITONEAL;  Surgeon: Tim Lee MD;  [facility]     REVISE SHUNT VENTRICULARPERITONEAL Right 10/26/2015    Procedure: REVISE SHUNT VENTRICULARPERITONEAL;  Surgeon: Tim Lee MD;  [facility]       No family history on file.    Social History   Substance Use Topics     Smoking status: Never Smoker     Smokeless tobacco: Never Used     Alcohol use No       No current facility-administered medications for this encounter.      Current Outpatient Prescriptions   Medication     VITAMIN D, CHOLECALCIFEROL, PO     senna-docusate (SENOKOT-S;PERICOLACE) 8.6-50 MG per tablet     calcium carbonate (TUMS) 500 MG chewable tablet     ondansetron (ZOFRAN-ODT) 4 MG disintegrating tablet     acetaminophen 650 MG TABS        Allergies   Allergen Reactions     Morphine Hcl Rash       Review of Systems   Eyes: Negative for visual  "disturbance.   Musculoskeletal: Positive for gait problem.   Neurological: Positive for tremors, numbness (right-sided facial) and headaches. Negative for weakness.   All other systems reviewed and are negative.      Physical Exam   BP: (!) 150/98  Pulse: 90  Heart Rate: 93  Temp: 98  F (36.7  C)  Resp: 16  Height: 162.6 cm (5' 4\")  Weight: 89.1 kg (196 lb 8 oz)  SpO2: 99 %      Physical Exam   Constitutional: She is oriented to person, place, and time. Vital signs are normal. She appears well-developed and well-nourished.  Non-toxic appearance. She does not appear ill. No distress.   Patient is awake and alert, mentating normally, no acute distress.   HENT:   Head: Normocephalic and atraumatic.   Mouth/Throat: Oropharynx is clear and moist. No oropharyngeal exudate.   Eyes: Conjunctivae and EOM are normal. Pupils are equal, round, and reactive to light. No scleral icterus.   Neck: Normal range of motion. Neck supple. No JVD present. No tracheal deviation present. No thyromegaly present.   No meningeal signs noted.   Cardiovascular: Normal rate, regular rhythm, normal heart sounds and intact distal pulses.  Exam reveals no gallop and no friction rub.    No murmur heard.  Pulmonary/Chest: Effort normal and breath sounds normal. No respiratory distress.   Abdominal: Soft. Bowel sounds are normal. She exhibits no distension and no mass. There is no tenderness.   Musculoskeletal: Normal range of motion. She exhibits no edema or tenderness.   Lymphadenopathy:     She has no cervical adenopathy.   Neurological: She is alert and oriented to person, place, and time. She has normal strength. She is not disoriented. No cranial nerve deficit.   Patient reports subjective decreased sensation right side of face and body.   Skin: Skin is warm and dry. No rash noted. No erythema. No pallor.   Psychiatric: She has a normal mood and affect. Her behavior is normal.   Nursing note and vitals reviewed.      ED Course     ED Course "     Procedures        Results for orders placed or performed during the hospital encounter of 04/10/18   CT Head Neck Angio w/o & w Contrast    Narrative    CTA ANGIOGRAM HEAD NECK 4/10/2018 11:53 AM    Head CT without contrast  CT angiogram of the neck   CT angiogram of the base of the brain with contrast  Reconstruction by the Radiologist on the 3D workstation    History:  Right facial numbness, history of  shunt;   Comparison:  Head CT 8/21/2017.    Technique:    HEAD CT:  Using multidetector thin collimation helical acquisition  technique, axial, coronal and sagittal CT images from the skull base  to the vertex were obtained without intravenous contrast.     HEAD and NECK CTA: During rapid bolus intravenous injection of  nonionic contrast material, axial images were obtained using thin  collimation multidetector helical technique from the top of the skull  through the aortic arch. This CT angiogram data was reconstructed at  thin intervals with mild overlap. Images were sent to the Coin  workstation, and 3D reconstructions were obtained. The axial source  images, multiplanar reformations, 3D reconstructions in both maximum  intensity projection display and volume rendered models were reviewed,  with reconstructions performed by the technologist and the  radiologist.    Contrast: iopamidol (ISOVUE-370) solution 75 mL    Findings:  Head CT:  Stable positioning of right frontal and right parietal  approach ventriculostomy catheters with tips terminating in the region  of the foramen of Monro. No ventriculomegaly. No loss of gray-white  matter differentiation to suggest infarct. Stable postsurgical changes  of suboccipital craniectomy and C1 laminectomy for Chiari I  decompression.    No intracranial hemorrhage, mass effect, midline shift or abnormal  extra axial fluid collection. Right basal ganglia enlarged  perivascular space. Minimal leukoaraiosis.     Paranasal sinuses and mastoid air cells are  clear.    Head CTA demonstrates no definite aneurysm or stenosis of the major  intracranial arteries. The anterior communicating artery is patent.  The posterior communicating arteries are patent bilaterally.     Neck CTA demonstrates no evident stenosis of the major cervical  arteries. The origins of the great vessels from the aortic arch are  patent. The normal distal right internal carotid artery measures 5 mm.  The normal distal left internal carotid artery measures 5 mm.     No mass is noted within the visualized portions of the cervical soft  tissues or lung apices.       Impression    Impression:  1. Normal head and neck CTA.  2. No evidence of acute intracranial pathology. Stable head CT since  8/21/2017    I have personally reviewed the examination and initial interpretation  and I agree with the findings.    YANN RIGGS MD   MR Brain for Stroke Limited    Narrative    Limited Stroke Brain MRI and Head MRA without contrast    History: Right-sided body numbness, please do stroke limited; .  ICD-10:  Comparison: 4/10/2018 head CT and multiple prior    Technique: Axial diffusion-weighted and axial turboFLAIR images of the  brain were obtained without intravenous contrast.    Findings: As on the CT scan from the same day, there are 2 shunt  catheters, one entering the right frontal region and one entering the  right parieto-occipital region, causing large amount of artifact  obscuring much of the right hemicerebrum. No overt acute infarct  noted. Axial diffusion-weighted images demonstrate no overt  infarction, although the above-mentioned artifact does obscure  evaluation; however, on FLAIR imaging, no underlying signal  abnormalities are identified. Left superior frontal focal atrophy  noted underlying site of prior craniotomy. There is no mass-effect or  midline shift. The ventricles appear within normal size limits for the  patient's age.        Impression    Impression:  No evidence of acute  infarction or hydrocephalus on limited MRI  imaging. Limited evaluation given artifact from shunt catheters.        YANN RIGGS MD   CBC with platelets differential   Result Value Ref Range    WBC 8.6 4.0 - 11.0 10e9/L    RBC Count 5.09 3.8 - 5.2 10e12/L    Hemoglobin 15.3 11.7 - 15.7 g/dL    Hematocrit 45.7 35.0 - 47.0 %    MCV 90 78 - 100 fl    MCH 30.1 26.5 - 33.0 pg    MCHC 33.5 31.5 - 36.5 g/dL    RDW 12.7 10.0 - 15.0 %    Platelet Count 259 150 - 450 10e9/L    Diff Method Automated Method     % Neutrophils 55.9 %    % Lymphocytes 33.4 %    % Monocytes 7.8 %    % Eosinophils 2.1 %    % Basophils 0.2 %    % Immature Granulocytes 0.6 %    Nucleated RBCs 0 0 /100    Absolute Neutrophil 4.8 1.6 - 8.3 10e9/L    Absolute Lymphocytes 2.9 0.8 - 5.3 10e9/L    Absolute Monocytes 0.7 0.0 - 1.3 10e9/L    Absolute Eosinophils 0.2 0.0 - 0.7 10e9/L    Absolute Basophils 0.0 0.0 - 0.2 10e9/L    Abs Immature Granulocytes 0.1 0 - 0.4 10e9/L    Absolute Nucleated RBC 0.0    Basic metabolic panel   Result Value Ref Range    Sodium 137 133 - 144 mmol/L    Potassium 4.1 3.4 - 5.3 mmol/L    Chloride 105 94 - 109 mmol/L    Carbon Dioxide 26 20 - 32 mmol/L    Anion Gap 6 3 - 14 mmol/L    Glucose 87 70 - 99 mg/dL    Urea Nitrogen 21 7 - 30 mg/dL    Creatinine 0.84 0.52 - 1.04 mg/dL    GFR Estimate 73 >60 mL/min/1.7m2    GFR Estimate If Black 88 >60 mL/min/1.7m2    Calcium 9.9 8.5 - 10.1 mg/dL   Partial thromboplastin time   Result Value Ref Range    PTT 28 22 - 37 sec   INR   Result Value Ref Range    INR 0.98 0.86 - 1.14   Glucose by meter   Result Value Ref Range    Glucose 107 (H) 70 - 99 mg/dL   INR point of care   Result Value Ref Range    INR Point of Care 1.0 0.86 - 1.14   Creatinine POCT   Result Value Ref Range    Creatinine 0.9 0.52 - 1.04 mg/dL    GFR Estimate 67 >60 mL/min/1.7m2    GFR Estimate If Black 82 >60 mL/min/1.7m2   EKG 12-lead, tracing only   Result Value Ref Range    Interpretation ECG Click View Image  link to view waveform and result          Assessments & Plan (with Medical Decision Making)     This patient presented to the emergency department complaining of decreased sensation on the right side of the face and body.  She also had concerns that she had been having seizures.  Given her neurologic complaints of stroke code was called, but I also spoke with neurosurgery as the patient has a history of  shunt and has had similar symptoms she states with  shunt malfunction.  CT scan demonstrated no evidence of bleed, shunt malfunction, or other acute process.  Neurology asked that an MRI be obtained.  This demonstrated no evidence of acute process.  At this point in time, pending neurosurgical evaluation, plan will be to discharge patient after being seen by neurosurgery and having her shunt reprogrammed.    I have reviewed the nursing notes.    I have reviewed the findings, diagnosis, plan and need for follow up with the patient.    Discharge Medication List as of 4/10/2018  5:54 PM          Final diagnoses:    (ventriculoperitoneal) shunt status   Right facial numbness   I, Concha Taveras, am serving as a trained medical scribe to document services personally performed by Good Young MD, based on the provider's statements to me.   Good ZARAGOZA MD, was physically present and have reviewed and verified the accuracy of this note documented by Concha Taveras.      4/10/2018   [facility], [place], EMERGENCY DEPARTMENT     Jeff Young MD  04/13/18 1575

## 2018-04-10 NOTE — ED AVS SNAPSHOT
[facility], [place], Emergency Department    [address] [place] [id number]    Phone:  [phone]                                       Loyda Abraham   MRN: [id number]    Department:  [facility], Emergency Department   Date of Visit:  4/10/2018           After Visit Summary Signature Page     I have received my discharge instructions, and my questions have been answered. I have discussed any challenges I see with this plan with the nurse or doctor.    ..........................................................................................................................................  Patient/Patient Representative Signature      ..........................................................................................................................................  Patient Representative Print Name and Relationship to Patient    ..................................................               ................................................  Date                                            Time    ..........................................................................................................................................  Reviewed by Signature/Title    ...................................................              ..............................................  Date                                                            Time

## 2018-04-10 NOTE — CONSULTS
[facility], [place]      Neurology Stroke Consult    Patient Name: Loyda Abraham  : 1970 MRN#: [id number]    STROKE DATA    Stroke Code:  Time called:  04/10/2018 1124  Time patient seen:  04/10/2018 1127  Onset of symptoms:  04/10/2018 0230  Last known normal (pt's baseline):  18 at bed time  Head CT read by Dr. Templeton at:  04/10/2018 1140  Stroke Code de-escalated at 04/10/2018 1131 after discussion with Dr. Templeton due to symptoms not likely caused by stroke and patient is outside emergent treatment time parameters.     TPA treatment:  Not given due to outside the time window, minor / isolated / quickly resolving stroke symptoms.     National Institutes of Health Stroke Scale (at presentation)  NIHSS done at:  time patient seen      Score    Level of consciousness:  (0)   Alert, keenly responsive     LOC questions:  (0)   Answers both questions correctly    LOC commands:  (0)   Performs both tasks correctly    Best gaze:  (0)   Normal    Visual:  (0)   No visual loss    Facial palsy:  (0)   Normal symmetrical movements    Motor arm (left):  (0)   No drift    Motor arm (right):  (0)   No drift    Motor leg (left):  (0)   No drift    Motor leg (right):  (0)   No drift    Limb ataxia:  (0)   Absent    Sensory:  (1)   Mild to moderate sensory loss    Best language:  (0)   Normal- no aphasia    Dysarthria:  (0)   Normal    Extinction and inattention:  (0)   No abnormality        NIHSS Total Score:  1        Dysphagia Screen  Time of screenin/10/2018 1145  Screening results: Passed screening, no dysarthria - Regular Diet with thin liquids     ASSESSMENT & RECOMMENDATIONS     The patient was seen for right sided numbness and abnormal movements. Stroke code called for sensory changes. She presented outside the time window. Exam normal except for R sided sensation loss. CT/CTA were performed and were negative for any acute changes. MRI performed and negative for ischemic stroke.  We do not feel that her symptoms represent stroke. Her abnormal movements do not sound consistent with seizures. From a stroke standpoint the patient can discharge back to home. She should have her shunt recalibrated by NS.     Recommendations:  -No further workup from stroke standpoint  -Follow up with Dr. Lee in neurosurgery in 2 weeks     The patient will be managed by the ED team and  we will sign off at this time.  Thank you for the consult.  Contact the stroke team if you have any further questions.    HPI  Loyda Abraham is a 47 year old female with history of Chiari malformation s/p decompression 2012 complicated by pseudomeningocele s/p  shunt placement x2. She presents to the ED for acute right sided numbness and shaking.    Ms. Abraham reports that she went to bed last night in her usual state of health. Around 0200 this morning she awoke and had tongue biting. This was followed by shaking of her upper extremities, which lasted for around 30 minutes. She was awake and alert the whole time she had these abnormal movements and describes the movements as rhythmic shaking of her UE with no shaking of her LE. She reports she lost urine during this episode. The movements subsided in about 30 minutes, after which she noted numbness on the R side of her face. She ignored this and tried to go back to sleep. Around 0400 she again had tongue biting followed by the same rhythmic abnormal movements. This again last for about 30 minutes, during which she was completely awake and alert. When she got out of bed around 0600 she noted some clumsiness of her R foot. She called her neurosurgeon's office who advised her to come to the ED.    Pertinent Past Medical/Surgical History  Past Medical History:   Diagnosis Date     Gastro-oesophageal reflux disease      Hydrocephalus      PONV (postoperative nausea and vomiting)        Past Surgical History:   Procedure Laterality Date     CHOLECYSTECTOMY       DECOMPRESSION CHIARI   11/22/2013    Procedure: DECOMPRESSION CHIARI;;  Surgeon: Tim Lee MD;  [facility]     OPTICAL TRACKING SYSTEM IMPLANT SHUNT VENTRICULOPERITONEAL Left 1/6/2015    Procedure: OPTICAL TRACKING SYSTEM IMPLANT SHUNT VENTRICULOPERITONEAL;  Surgeon: Tim Lee MD;  [facility]     OPTICAL TRACKING SYSTEM IMPLANT SHUNT VENTRICULOPERITONEAL Right 3/6/2015    Procedure: OPTICAL TRACKING SYSTEM IMPLANT SHUNT VENTRICULOPERITONEAL;  Surgeon: Tim Lee MD;  [facility]     OPTICAL TRACKING SYSTEM IMPLANT SHUNT VENTRICULOPERITONEAL Right 12/9/2015    Procedure: OPTICAL TRACKING SYSTEM IMPLANT SHUNT VENTRICULOPERITONEAL;  Surgeon: Tim Lee MD;  [facility]     OPTICAL TRACKING SYSTEM IMPLANT SHUNT VENTRICULOPERITONEAL Right 12/15/2015    Procedure: OPTICAL TRACKING SYSTEM IMPLANT SHUNT VENTRICULOPERITONEAL;  Surgeon: Tim Lee MD;  [facility]     ORTHOPEDIC SURGERY       REVISE SHUNT VENTRICULARPERITONEAL  11/22/2013    Procedure: REVISE SHUNT VENTRICULARPERITONEAL;  Left Ventricularperitoneal Shunt Exploration with Revision,   Suboccipital Pseudomeningocele Repair;  Surgeon: Tim Lee MD;  [facility]     REVISE SHUNT VENTRICULARPERITONEAL Left 2/27/2015    Procedure: REVISE SHUNT VENTRICULARPERITONEAL;  Surgeon: Tim Lee MD;  [facility]     REVISE SHUNT VENTRICULARPERITONEAL Right 10/26/2015    Procedure: REVISE SHUNT VENTRICULARPERITONEAL;  Surgeon: Tim Lee MD;  [facility]       Medications: I have reviewed this patient's current medications.    Allergies:   Allergies   Allergen Reactions     Morphine Hcl Rash   .    Family History: I have reviewed this patient's family history.    Social History:   Social History   Substance Use Topics     Smoking status: Never Smoker     Smokeless tobacco: Never Used     Alcohol use No   .    Tobacco use: Never    ROS:  The 10 point Review of Systems  is negative other than noted in the HPI or here.     PHYSICAL EXAMINATION  Vital Signs:  B/P: 129/79,  T: 98,  P: 99,  R: 25    General:  patient lying in bed without any acute distress    HEENT:  normocephalic/atraumatic  Cardio:  RRR  Pulmonary:  no respiratory distress  Abdomen:  soft, non-distended  Extremities:  no edema  Skin:  intact, warm/dry     Neurologic  Mental Status:  fully alert, follows commands, speech clear and fluent  Cranial Nerves:  visual fields intact, PERRL, EOMI with normal smooth pursuit, facial movements symmetric, hearing not formally tested but intact to conversation, no dysarthria, tongue protrusion midline, R face with decreased sensation (75% compared to L)  Motor:  no abnormal movements, no pronator drift, able to move all limbs spontaneously, strength 5/5 throughout upper and lower extremities  Sensory:  Decreased light touch sensation on RUE and RLE (75% compared to L)  Coordination:  FNF and HS intact without dysmetria  Station/Gait:  Deferred    Labs  Labs and Imaging reviewed and used in developing the plan; pertinent results included.     Lab Results   Component Value Date    GLC 87 04/10/2018       The patient was discussed with the Fellow, Dr. Templeton.  The staff is Dr. Blair.    Buddy Shea MD

## 2018-04-10 NOTE — ED NOTES
Pt signed out to me by Dr Young. Neurosurg consult-ok for the pt to be DC'ed. Pt wish to be DC'ed. Will DC and follow up with Neuro and Neurosurg.     Juan Antonio Deleon MD  04/10/18 [id number]

## 2018-04-10 NOTE — DISCHARGE INSTRUCTIONS
Please make an appointment to follow up with Neurology Clinic (phone: [phone]) and Neurosurgery Clinic (phone: [phone]) as directed even if entirely better.

## 2018-04-10 NOTE — TELEPHONE ENCOUNTER
Patient calling in tears stating she awoke last night around 2am with uncontrollable shaking and biting tongue. Patient states thinks it went on for about 30 minutes, still feels like upper body is shaking some.  Denies any known LOC, nausea for the past several hours.  Pt is alert, talking well, anxious.    LOV with Jesus 10/24/17   Shunt    States no history of seizure activity, and unable to see PCP today.  Recommend N ER to be evaluated.  Pt voices understanding,  to drive pt to ER.

## 2018-04-10 NOTE — ED AVS SNAPSHOT
[facility], Emergency Department    [address] [address]    Phone:  [phone]                                       Loyda Abraham   MRN: [id number]    Department:  [facility], Emergency Department   Date of Visit:  4/10/2018           Patient Information     Date Of Birth          1970        Your diagnoses for this visit were:      (ventriculoperitoneal) shunt status     Right facial numbness        You were seen by Jeff Young MD and Juan Antonio Deleon MD.        Discharge Instructions       Please make an appointment to follow up with Neurology Clinic (phone: [phone]) and Neurosurgery Clinic (phone: [phone]) as directed even if entirely better.     Discharge References/Attachments     PARAESTHESIAS (ENGLISH)      24 Hour Appointment Hotline       To make an appointment at any [facility], call [phone] ([phone]). If you don't have a family doctor or clinic, we will help you find one. [facility] are conveniently located to serve the needs of you and your family.             Review of your medicines      Our records show that you are taking the medicines listed below. If these are incorrect, please call your family doctor or clinic.        Dose / Directions Last dose taken    ACETAMINOPHEN 8 HOUR 650 MG 8 hour tablet   Dose:  650 mg   Quantity:  100 tablet   Generic drug:  acetaminophen        Take 650 mg by mouth every 4 hours as needed   Refills:  0        calcium carbonate 500 MG chewable tablet   Commonly known as:  TUMS   Dose:  1 chew tab   Indication:  Abdominal Discomfort        Take 1 chew tab by mouth daily   Refills:  0        ondansetron 4 MG ODT tab   Commonly known as:  ZOFRAN-ODT   Dose:  4 mg   Quantity:  30 tablet        Take 1 tablet (4 mg) by mouth every 6 hours as needed for nausea   Refills:  2        senna-docusate 8.6-50 MG per tablet   Commonly known as:  SENOKOT-S;PERICOLACE   Dose:  1-2 tablet   Quantity:  30  tablet        Take 1-2 tablets by mouth as needed Hold for loose stools.   Refills:  0        VITAMIN D (CHOLECALCIFEROL) PO   Dose:  5000 Units        Take 5,000 Units by mouth twice a week   Refills:  0                Procedures and tests performed during your visit     Activity: Bedrest    Basic metabolic panel    CBC with platelets differential    CT Head Neck Angio w/o & w Contrast    Dysphagia Screen    EKG 12-lead, tracing only    Glucose by meter    Glucose monitor nursing POCT    INR    ISTAT INR nursing POCT    ISTAT troponin nursing POCT    MR Brain for Stroke Limited    Notify CT that Stroke patient is in ED    Partial thromboplastin time    Pulse oximetry nursing    Vital signs and neuro checks      Orders Needing Specimen Collection     None      Pending Results     Date and Time Order Name Status Description    4/10/2018 1132 EKG 12-lead, tracing only Preliminary             Pending Culture Results     No orders found from 4/8/2018 to 4/11/2018.            Pending Results Instructions     If you had any lab results that were not finalized at the time of your Discharge, you can call the ED Lab Result RN at [phone]. You will be contacted by this team for any positive Lab results or changes in treatment. The nurses are available 7 days a week from 10A to 6:30P.  You can leave a message 24 hours per day and they will return your call.        Thank you for choosing [facility]       Thank you for choosing [facility] for your care. Our goal is always to provide you with excellent care. Hearing back from our patients is one way we can continue to improve our services. Please take a few minutes to complete the written survey that you may receive in the mail after you visit with us. Thank you!        [employer] Information     [employer] gives you secure access to your electronic health record. If you see a primary care provider, you can also send messages to your care team and make appointments. If you have  questions, please call your primary care clinic.  If you do not have a primary care provider, please call [phone] and they will assist you.        Care EveryWhere ID     This is your Care EveryWhere ID. This could be used by other organizations to access your [facility] medical records  [id number]        Equal Access to Services     SULAIMAN HEIN : Kyle Tang, wajose carlos leblanc, jose friedalashlie washington, goyo saeed. So [facility] [phone].    ATENCIÓN: Si habla español, tiene a nguyen disposición servicios gratuitos de asistencia lingüística. Llame al [phone].    We comply with applicable federal civil rights laws and [place] laws. We do not discriminate on the basis of race, color, national origin, age, disability, sex, sexual orientation, or gender identity.            After Visit Summary       This is your record. Keep this with you and show to your community pharmacist(s) and doctor(s) at your next visit.

## 2018-04-10 NOTE — PHARMACY
Pharmacy Stroke Code Response  Pharmacist responded as part of the Stroke Code Team activation to patient care area ED room 8.  The Stroke Team determined that the patient was not a candidate for IV alteplase therapy and the pharmacy team was dismissed at 11:28.    Nas Schaeffer, PharmD  PGY-2 Critical Care Pharmacy Resident

## 2018-04-11 LAB — INTERPRETATION ECG - MUSE: NORMAL

## 2018-04-24 ENCOUNTER — OFFICE VISIT (OUTPATIENT)
Dept: NEUROSURGERY | Facility: CLINIC | Age: 48
End: 2018-04-24
Payer: COMMERCIAL

## 2018-04-24 VITALS
SYSTOLIC BLOOD PRESSURE: 137 MMHG | WEIGHT: 198.3 LBS | BODY MASS INDEX: 33.86 KG/M2 | HEIGHT: 64 IN | DIASTOLIC BLOOD PRESSURE: 87 MMHG | HEART RATE: 83 BPM

## 2018-04-24 DIAGNOSIS — Z98.2 S/P VENTRICULOPERITONEAL SHUNT: Primary | ICD-10-CM

## 2018-04-24 RX ORDER — ALBUTEROL SULFATE 90 UG/1
2 AEROSOL, METERED RESPIRATORY (INHALATION) PRN
COMMUNITY
Start: 2016-05-04

## 2018-04-24 RX ORDER — OMEGA-3-ACID ETHYL ESTERS 1 G/1
2 CAPSULE, LIQUID FILLED ORAL 2 TIMES DAILY
COMMUNITY

## 2018-04-24 ASSESSMENT — PAIN SCALES - GENERAL: PAINLEVEL: SEVERE PAIN (6)

## 2018-04-24 NOTE — LETTER
4/24/2018       RE: Loyda Abraham  [address] [place] [address]     Dear Colleague,    Thank you for referring your patient, Loyda Abraham, to the [facility] NEUROSURGERY at [facility]. Please see a copy of my visit note below.    No notes on file    Again, thank you for allowing me to participate in the care of your patient.      Sincerely,    Tim Lee MD
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
## 2018-04-24 NOTE — MR AVS SNAPSHOT
"              After Visit Summary   4/24/2018    Loyda Abraham    MRN: [id number]           Patient Information     Date Of Birth          1970        Visit Information        Provider Department      4/24/2018 8:30 AM Tim Lee MD [facility] Neurosurgery        Today's Diagnoses     S/P ventriculoperitoneal shunt    -  1       Follow-ups after your visit        Who to contact     Please call your clinic at [phone] to:    Ask questions about your health    Make or cancel appointments    Discuss your medicines    Learn about your test results    Speak to your doctor            Additional Information About Your Visit        MyChart Information     [employer] gives you secure access to your electronic health record. If you see a primary care provider, you can also send messages to your care team and make appointments. If you have questions, please call your primary care clinic.  If you do not have a primary care provider, please call [phone] and they will assist you.      [employer] is an electronic gateway that provides easy, online access to your medical records. With [employer], you can request a clinic appointment, read your test results, renew a prescription or communicate with your care team.     To access your existing account, please contact your [facility] or call [phone] for assistance.        Care EveryWhere ID     This is your Care EveryWhere ID. This could be used by other organizations to access your [place] medical records  [id number]        Your Vitals Were     Pulse Height BMI (Body Mass Index)             83 1.626 m (5' 4\") 34.04 kg/m2          Blood Pressure from Last 3 Encounters:   No data found for BP    Weight from Last 3 Encounters:   No data found for Wt              Today, you had the following     No orders found for display         Today's Medication Changes          These changes are accurate as of 4/24/18 11:59 PM.  If you have any " questions, ask your nurse or doctor.               These medicines have changed or have updated prescriptions.        Dose/Directions    ondansetron 4 MG ODT tab   Commonly known as:  ZOFRAN-ODT   This may have changed:  when to take this   Used for:  Nausea        Dose:  4 mg   Take 1 tablet (4 mg) by mouth every 6 hours as needed for nausea   Quantity:  30 tablet   Refills:  2                Primary Care Provider Office Phone # Fax #    Maylin Cline PA-C [phone]       [address] [address]        Equal Access to Services     [facility]: Hadii aad ku hadasho Soomaali, waaxda luqadaha, qaybta kaalmada adeegyachase, goyo mack . So [facility] [phone].    ATENCIÓN: Si habla español, tiene a nguyen disposición servicios gratuitos de asistencia lingüística. [facility] [phone].    We comply with applicable federal civil rights laws and [place] laws. We do not discriminate on the basis of race, color, national origin, age, disability, sex, sexual orientation, or gender identity.            Thank you!     Thank you for choosing [facility] NEUROSURGERY  for your care. Our goal is always to provide you with excellent care. Hearing back from our patients is one way we can continue to improve our services. Please take a few minutes to complete the written survey that you may receive in the mail after your visit with us. Thank you!             Your Updated Medication List - Protect others around you: Learn how to safely use, store and throw away your medicines at www.disposemymeds.org.          This list is accurate as of 4/24/18 11:59 PM.  Always use your most recent med list.                   Brand Name Dispense Instructions for use Diagnosis    ACETAMINOPHEN 8 HOUR 650 MG 8 hour tablet   Generic drug:  acetaminophen     100 tablet    Take 650 mg by mouth every 4 hours as needed     (ventriculoperitoneal) shunt status       albuterol  108 (90 Base) MCG/ACT Inhaler    PROAIR HFA/PROVENTIL HFA/VENTOLIN HFA     Inhale 2 puffs into the lungs as needed        calcium carbonate 500 MG chewable tablet    TUMS     Take 1 chew tab by mouth daily        echincea extract capsule      Take 250 mg by mouth 2 times daily        omega-3 acid ethyl esters 1 g capsule    Lovaza     Take 2 g by mouth 2 times daily        ondansetron 4 MG ODT tab    ZOFRAN-ODT    30 tablet    Take 1 tablet (4 mg) by mouth every 6 hours as needed for nausea    Nausea       senna-docusate 8.6-50 MG per tablet    SENOKOT-S;PERICOLACE    30 tablet    Take 1-2 tablets by mouth as needed Hold for loose stools.    Shunt malfunction, initial encounter       VITAMIN D (CHOLECALCIFEROL) PO      Take 5,000 Units by mouth twice a week
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
## 2019-11-07 ENCOUNTER — HEALTH MAINTENANCE LETTER (OUTPATIENT)
Age: 49
End: 2019-11-07

## 2020-02-11 ENCOUNTER — RECORDS - HEALTHEAST (OUTPATIENT)
Dept: LAB | Facility: CLINIC | Age: 50
End: 2020-02-11

## 2020-02-11 LAB
ANION GAP SERPL CALCULATED.3IONS-SCNC: 7 MMOL/L (ref 5–18)
BUN SERPL-MCNC: 14 MG/DL (ref 8–22)
CALCIUM SERPL-MCNC: 9.4 MG/DL (ref 8.5–10.5)
CHLORIDE BLD-SCNC: 104 MMOL/L (ref 98–107)
CO2 SERPL-SCNC: 28 MMOL/L (ref 22–31)
CREAT SERPL-MCNC: 0.76 MG/DL (ref 0.6–1.1)
GFR SERPL CREATININE-BSD FRML MDRD: >60 ML/MIN/1.73M2
GLUCOSE BLD-MCNC: 87 MG/DL (ref 70–125)
MAGNESIUM SERPL-MCNC: 2.1 MG/DL (ref 1.8–2.6)
POTASSIUM BLD-SCNC: 4.4 MMOL/L (ref 3.5–5)
SODIUM SERPL-SCNC: 139 MMOL/L (ref 136–145)

## 2020-02-14 ENCOUNTER — ANCILLARY PROCEDURE (OUTPATIENT)
Dept: CT IMAGING | Facility: CLINIC | Age: 50
End: 2020-02-14
Payer: COMMERCIAL

## 2020-02-14 DIAGNOSIS — Z86.69 HISTORY OF CHIARI MALFORMATION: ICD-10-CM

## 2020-02-14 LAB — ZINC SERPL-MCNC: 72.9 UG/DL (ref 60–120)

## 2020-02-19 ASSESSMENT — ENCOUNTER SYMPTOMS
HEADACHES: 1
BOWEL INCONTINENCE: 1
PARALYSIS: 0
SEIZURES: 0
HEMATURIA: 0
NUMBNESS: 0
LOSS OF CONSCIOUSNESS: 0
HALLUCINATIONS: 0
BLOATING: 0
DECREASED APPETITE: 0
SINUS CONGESTION: 0
SORE THROAT: 0
VOMITING: 0
SINUS PAIN: 0
SMELL DISTURBANCE: 1
BLOOD IN STOOL: 0
TROUBLE SWALLOWING: 0
DYSURIA: 1
WEAKNESS: 0
SPEECH CHANGE: 1
DIFFICULTY URINATING: 0
NIGHT SWEATS: 0
FATIGUE: 1
POLYDIPSIA: 0
NAUSEA: 0
WEIGHT LOSS: 1
DIZZINESS: 1
TREMORS: 0
DISTURBANCES IN COORDINATION: 0
CHILLS: 0
TINGLING: 1
CONSTIPATION: 0
JAUNDICE: 0
NECK MASS: 0
ALTERED TEMPERATURE REGULATION: 0
TASTE DISTURBANCE: 1
DIARRHEA: 0
HEARTBURN: 0
RECTAL PAIN: 0
MEMORY LOSS: 1
WEIGHT GAIN: 0
POLYPHAGIA: 0
ABDOMINAL PAIN: 0
INCREASED ENERGY: 1
FLANK PAIN: 0
FEVER: 0
HOARSE VOICE: 1

## 2020-02-24 ENCOUNTER — OFFICE VISIT (OUTPATIENT)
Dept: NEUROSURGERY | Facility: CLINIC | Age: 50
End: 2020-02-24
Payer: COMMERCIAL

## 2020-02-24 VITALS
HEART RATE: 90 BPM | RESPIRATION RATE: 16 BRPM | BODY MASS INDEX: 35.02 KG/M2 | OXYGEN SATURATION: 98 % | DIASTOLIC BLOOD PRESSURE: 81 MMHG | SYSTOLIC BLOOD PRESSURE: 126 MMHG | WEIGHT: 204 LBS

## 2020-02-24 DIAGNOSIS — Z98.2 S/P VENTRICULOPERITONEAL SHUNT: Primary | ICD-10-CM

## 2020-02-24 ASSESSMENT — PAIN SCALES - GENERAL: PAINLEVEL: MODERATE PAIN (5)

## 2020-02-24 NOTE — NURSING NOTE
Chief Complaint   Patient presents with     Follow Up     P RETURN - CHIARI DECOMPRESSION        Romario Muniz

## 2020-02-24 NOTE — LETTER
2/24/2020       RE: Loyda Abraham  [address] [place] [address]     Dear Colleague,    Thank you for referring your patient, Loyda Abraham, to the [facility] NEUROSURGERY at [facility]. Please see a copy of my visit note below.    Dear Maylin Cline,      Thank you for the opportunity to participate in the care of Loyda Abraham. As you know, she is a 46 yo female with neurosurgical history significant for Chiari I malformation s/p decompression in Feb 2012 complicated by development of psuedomeningocele, syringomyelocele, and multiple  shunt revisions for hydrocephalus (most recently in 2015, initially in May 2012 for psuedomeningocele).      Today, she presents in clinic for concerns of shunt malfunction. She is experience neck muscle spasm and facial numbness bilaterally. Her chiropractor noticed the shunt tubing protruding out of her skin and raised the concern and asked her to get evaluated. She denies any new neurologic symptoms such as muscle weakness, numbness or tingling, or lethargy.      On physical exam, the patient is in no acute distress and is awake and alert. She is breathing comfortably on room air. She has symmetric brow lift, tongue protrusion, palate elevation, smile, and sternocleidomastoid strength. The shunt tract was inspected and there was no evidence of erythema or catheter exposure. The patient's speech is fluent and not dysarthric. Her extraocular muscles are intact. She has 5/5 strength in the bilateral upper and lower extremities. Her strata valves were interrogated and found to be correct. Anterior valve performance level 1.0. Posterior valve performance level: 2.0.      CTH personally reviewed by me shows stable ventricular size no subdural hygroma or hematoma.      In summary, Loyda Abraham is a 46 yo female with complex neurosurgical history as described above. She should return to clinic as needed based on development of new or  worsened symptoms.      Patient was seen and discussed with Dr. Tim Lee MD.     Stephanie Stanford MD  Neurosurgery PGY2    Again, thank you for allowing me to participate in the care of your patient.      Sincerely,    Tim Lee MD

## 2020-02-24 NOTE — PROGRESS NOTES
Dear Maylin Cline,      Thank you for the opportunity to participate in the care of Loyda Abraham. As you know, she is a 46 yo female with neurosurgical history significant for Chiari I malformation s/p decompression in Feb 2012 complicated by development of psuedomeningocele, syringomyelocele, and multiple  shunt revisions for hydrocephalus (most recently in 2015, initially in May 2012 for psuedomeningocele).      Today, she presents in clinic for concerns of shunt malfunction. She is experience neck muscle spasm and facial numbness bilaterally. Her chiropractor noticed the shunt tubing protruding out of her skin and raised the concern and asked her to get evaluated. She denies any new neurologic symptoms such as muscle weakness, numbness or tingling, or lethargy.      On physical exam, the patient is in no acute distress and is awake and alert. She is breathing comfortably on room air. She has symmetric brow lift, tongue protrusion, palate elevation, smile, and sternocleidomastoid strength. The shunt tract was inspected and there was no evidence of erythema or catheter exposure. The patient's speech is fluent and not dysarthric. Her extraocular muscles are intact. She has 5/5 strength in the bilateral upper and lower extremities. Her strata valves were interrogated and found to be correct. Anterior valve performance level 1.0. Posterior valve performance level: 2.0.      CTH personally reviewed by me shows stable ventricular size no subdural hygroma or hematoma.      In summary, Loyda Abraham is a 46 yo female with complex neurosurgical history as described above. She should return to clinic as needed based on development of new or worsened symptoms.      Patient was seen and discussed with Dr. Tim Lee MD.     Stephanie Stanford MD  Neurosurgery PGY2

## 2020-03-12 LAB
HPV SOURCE: NORMAL
HUMAN PAPILLOMA VIRUS 16 DNA: NEGATIVE
HUMAN PAPILLOMA VIRUS 18 DNA: NEGATIVE
HUMAN PAPILLOMA VIRUS FINAL DIAGNOSIS: NORMAL
HUMAN PAPILLOMA VIRUS OTHER HR: NEGATIVE
SPECIMEN DESCRIPTION: NORMAL

## 2020-03-19 ENCOUNTER — RECORDS - HEALTHEAST (OUTPATIENT)
Dept: ADMINISTRATIVE | Facility: OTHER | Age: 50
End: 2020-03-19

## 2020-03-19 LAB
BKR LAB AP ABNORMAL BLEEDING: NO
BKR LAB AP BIRTH CONTROL/HORMONES: ABNORMAL
BKR LAB AP CERVICAL APPEARANCE: NORMAL
BKR LAB AP GYN ADEQUACY: ABNORMAL
BKR LAB AP GYN INTERPRETATION: ABNORMAL
BKR LAB AP HPV REFLEX: ABNORMAL
BKR LAB AP LMP: ABNORMAL
BKR LAB AP PATIENT STATUS: ABNORMAL
BKR LAB AP PREVIOUS ABNORMAL: ABNORMAL
BKR LAB AP PREVIOUS NORMAL: ABNORMAL
HIGH RISK?: YES
PATH REPORT.COMMENTS IMP SPEC: ABNORMAL
RESULT FLAG (HE HISTORICAL CONVERSION): ABNORMAL

## 2020-11-29 ENCOUNTER — HEALTH MAINTENANCE LETTER (OUTPATIENT)
Age: 50
End: 2020-11-29

## 2021-02-14 ENCOUNTER — HEALTH MAINTENANCE LETTER (OUTPATIENT)
Age: 51
End: 2021-02-14

## 2021-02-18 ENCOUNTER — TELEPHONE (OUTPATIENT)
Dept: NEUROSURGERY | Facility: CLINIC | Age: 51
End: 2021-02-18

## 2021-02-18 NOTE — TELEPHONE ENCOUNTER
[facility] Call Center    Phone Message    May a detailed message be left on voicemail: yes     Reason for Call: Other: Patient calling to see if she can get an appointment scheduled with Dr. Lee after her MRI next Tuesday 2/23 at 3:00 for a shunt check.     Please advise and call patient back at your earliest convenience to discuss scheduling options    Action Taken: Other: [facility] NEUROSURGERY     Travel Screening: Not Applicable

## 2021-02-21 ENCOUNTER — DOCUMENTATION ONLY (OUTPATIENT)
Dept: CARE COORDINATION | Facility: CLINIC | Age: 51
End: 2021-02-21

## 2021-02-23 ENCOUNTER — ANCILLARY PROCEDURE (OUTPATIENT)
Dept: MRI IMAGING | Facility: CLINIC | Age: 51
End: 2021-02-23
Attending: PHYSICIAN ASSISTANT
Payer: COMMERCIAL

## 2021-02-23 ENCOUNTER — OFFICE VISIT (OUTPATIENT)
Dept: NEUROSURGERY | Facility: CLINIC | Age: 51
End: 2021-02-23
Payer: COMMERCIAL

## 2021-02-23 VITALS
HEART RATE: 77 BPM | SYSTOLIC BLOOD PRESSURE: 121 MMHG | DIASTOLIC BLOOD PRESSURE: 77 MMHG | RESPIRATION RATE: 16 BRPM | OXYGEN SATURATION: 98 %

## 2021-02-23 DIAGNOSIS — R20.0 NUMBNESS OF RIGHT HAND: ICD-10-CM

## 2021-02-23 DIAGNOSIS — G91.9 HYDROCEPHALUS, UNSPECIFIED TYPE (H): ICD-10-CM

## 2021-02-23 DIAGNOSIS — Z98.2 S/P VENTRICULOPERITONEAL SHUNT: Primary | ICD-10-CM

## 2021-02-23 PROCEDURE — 72141 MRI NECK SPINE W/O DYE: CPT | Performed by: RADIOLOGY

## 2021-02-23 PROCEDURE — 99212 OFFICE O/P EST SF 10 MIN: CPT | Performed by: NURSE PRACTITIONER

## 2021-02-23 ASSESSMENT — PAIN SCALES - GENERAL: PAINLEVEL: NO PAIN (0)

## 2021-02-23 NOTE — LETTER
2/23/2021       RE: Loyda Abraham  [address] [place] [address]     Dear Colleague,    Thank you for referring your patient, Loyda Abraham, to the [facility] NEUROSURGERY CLINIC [place] at [facility]. Please see a copy of my visit note below.    46 yo female with neurosurgical history significant for Chiari I malformation s/p decompression in Feb 2012 complicated by development of psuedomeningocele, syringomyelocele, and multiple  shunt revisions for hydrocephalus (most recently in 2015, initially in May 2012 for psuedomeningocele).     She presents for  shunt programming following MR imaging of cervical spine     Anterior Valve- Dialed from 0.5 to 1.0   Confirmed x 2.   Posterior valve at correct setting of 2.0   Confirmed x 2.     No complications.     Chloe Webber DNP  Neurosurgery Nurse Practitioner  [facility] [place]  [phone]

## 2021-02-23 NOTE — PROGRESS NOTES
48 yo female with neurosurgical history significant for Chiari I malformation s/p decompression in Feb 2012 complicated by development of psuedomeningocele, syringomyelocele, and multiple  shunt revisions for hydrocephalus (most recently in 2015, initially in May 2012 for psuedomeningocele).     She presents for  shunt programming following MR imaging of cervical spine     Anterior Valve- Dialed from 0.5 to 1.0   Confirmed x 2.   Posterior valve at correct setting of 2.0   Confirmed x 2.     No complications.     Chloe Webber DNP  Neurosurgery Nurse Practitioner  [facility]  [phone]

## 2021-02-23 NOTE — NURSING NOTE
Chief Complaint   Patient presents with     RECHECK     [facility] RETURN- Shunt check      Rubin Limon     No pertinent family history in first degree relatives

## 2021-03-16 ENCOUNTER — RECORDS - HEALTHEAST (OUTPATIENT)
Dept: LAB | Facility: CLINIC | Age: 51
End: 2021-03-16

## 2021-03-16 LAB
CHOLEST SERPL-MCNC: 190 MG/DL
FASTING STATUS PATIENT QL REPORTED: NORMAL
HDLC SERPL-MCNC: 62 MG/DL
LDLC SERPL CALC-MCNC: 113 MG/DL
TRIGL SERPL-MCNC: 74 MG/DL

## 2021-05-31 ENCOUNTER — RECORDS - HEALTHEAST (OUTPATIENT)
Dept: ADMINISTRATIVE | Facility: CLINIC | Age: 51
End: 2021-05-31

## 2021-09-19 ENCOUNTER — HEALTH MAINTENANCE LETTER (OUTPATIENT)
Age: 51
End: 2021-09-19

## 2022-01-09 ENCOUNTER — HEALTH MAINTENANCE LETTER (OUTPATIENT)
Age: 52
End: 2022-01-09

## 2022-03-06 ENCOUNTER — HEALTH MAINTENANCE LETTER (OUTPATIENT)
Age: 52
End: 2022-03-06

## 2022-04-29 ENCOUNTER — LAB REQUISITION (OUTPATIENT)
Dept: LAB | Facility: CLINIC | Age: 52
End: 2022-04-29
Payer: COMMERCIAL

## 2022-04-29 DIAGNOSIS — R59.1 GENERALIZED ENLARGED LYMPH NODES: ICD-10-CM

## 2022-04-29 LAB
ALBUMIN SERPL-MCNC: 3.8 G/DL (ref 3.5–5)
ALP SERPL-CCNC: 73 U/L (ref 45–120)
ALT SERPL W P-5'-P-CCNC: 18 U/L (ref 0–45)
ANION GAP SERPL CALCULATED.3IONS-SCNC: 11 MMOL/L (ref 5–18)
AST SERPL W P-5'-P-CCNC: 18 U/L (ref 0–40)
BASOPHILS # BLD AUTO: 0 10E3/UL (ref 0–0.2)
BASOPHILS NFR BLD AUTO: 1 %
BILIRUB SERPL-MCNC: 0.4 MG/DL (ref 0–1)
BUN SERPL-MCNC: 10 MG/DL (ref 8–22)
CALCIUM SERPL-MCNC: 9.4 MG/DL (ref 8.5–10.5)
CHLORIDE BLD-SCNC: 104 MMOL/L (ref 98–107)
CO2 SERPL-SCNC: 23 MMOL/L (ref 22–31)
CREAT SERPL-MCNC: 0.78 MG/DL (ref 0.6–1.1)
EOSINOPHIL # BLD AUTO: 0.2 10E3/UL (ref 0–0.7)
EOSINOPHIL NFR BLD AUTO: 3 %
ERYTHROCYTE [DISTWIDTH] IN BLOOD BY AUTOMATED COUNT: 12.6 % (ref 10–15)
GFR SERPL CREATININE-BSD FRML MDRD: >90 ML/MIN/1.73M2
GLUCOSE BLD-MCNC: 103 MG/DL (ref 70–125)
HCT VFR BLD AUTO: 43.2 % (ref 35–47)
HGB BLD-MCNC: 14.3 G/DL (ref 11.7–15.7)
IMM GRANULOCYTES # BLD: 0 10E3/UL
IMM GRANULOCYTES NFR BLD: 1 %
LYMPHOCYTES # BLD AUTO: 1.9 10E3/UL (ref 0.8–5.3)
LYMPHOCYTES NFR BLD AUTO: 31 %
MCH RBC QN AUTO: 29.7 PG (ref 26.5–33)
MCHC RBC AUTO-ENTMCNC: 33.1 G/DL (ref 31.5–36.5)
MCV RBC AUTO: 90 FL (ref 78–100)
MONOCYTES # BLD AUTO: 0.7 10E3/UL (ref 0–1.3)
MONOCYTES NFR BLD AUTO: 11 %
NEUTROPHILS # BLD AUTO: 3.3 10E3/UL (ref 1.6–8.3)
NEUTROPHILS NFR BLD AUTO: 53 %
NRBC # BLD AUTO: 0 10E3/UL
NRBC BLD AUTO-RTO: 0 /100
PLATELET # BLD AUTO: 324 10E3/UL (ref 150–450)
POTASSIUM BLD-SCNC: 4.1 MMOL/L (ref 3.5–5)
PROT SERPL-MCNC: 7 G/DL (ref 6–8)
RBC # BLD AUTO: 4.82 10E6/UL (ref 3.8–5.2)
SODIUM SERPL-SCNC: 138 MMOL/L (ref 136–145)
WBC # BLD AUTO: 6.2 10E3/UL (ref 4–11)

## 2022-04-29 PROCEDURE — 80053 COMPREHEN METABOLIC PANEL: CPT | Mod: ORL | Performed by: PHYSICIAN ASSISTANT

## 2022-04-29 PROCEDURE — 85025 COMPLETE CBC W/AUTO DIFF WBC: CPT | Mod: ORL | Performed by: PHYSICIAN ASSISTANT

## 2022-12-25 ENCOUNTER — HEALTH MAINTENANCE LETTER (OUTPATIENT)
Age: 52
End: 2022-12-25

## 2023-04-16 ENCOUNTER — HEALTH MAINTENANCE LETTER (OUTPATIENT)
Age: 53
End: 2023-04-16